# Patient Record
Sex: FEMALE | Race: WHITE | NOT HISPANIC OR LATINO | Employment: OTHER
[De-identification: names, ages, dates, MRNs, and addresses within clinical notes are randomized per-mention and may not be internally consistent; named-entity substitution may affect disease eponyms.]

---

## 2017-01-17 ENCOUNTER — RECORDS - HEALTHEAST (OUTPATIENT)
Dept: ADMINISTRATIVE | Facility: OTHER | Age: 62
End: 2017-01-17

## 2017-01-17 LAB
LAB AP CHARGES (HE HISTORICAL CONVERSION): NORMAL
PATH REPORT.COMMENTS IMP SPEC: NORMAL
PATH REPORT.COMMENTS IMP SPEC: NORMAL
PATH REPORT.FINAL DX SPEC: NORMAL
PATH REPORT.GROSS SPEC: NORMAL
PATH REPORT.RELEVANT HX SPEC: NORMAL
RESULT FLAG (HE HISTORICAL CONVERSION): NORMAL

## 2017-09-12 ENCOUNTER — RECORDS - HEALTHEAST (OUTPATIENT)
Dept: ADMINISTRATIVE | Facility: OTHER | Age: 62
End: 2017-09-12

## 2017-10-18 ENCOUNTER — RECORDS - HEALTHEAST (OUTPATIENT)
Dept: BONE DENSITY | Facility: CLINIC | Age: 62
End: 2017-10-18

## 2017-10-18 ENCOUNTER — RECORDS - HEALTHEAST (OUTPATIENT)
Dept: ADMINISTRATIVE | Facility: OTHER | Age: 62
End: 2017-10-18

## 2017-10-18 DIAGNOSIS — M85.80 OTHER SPECIFIED DISORDERS OF BONE DENSITY AND STRUCTURE, UNSPECIFIED SITE: ICD-10-CM

## 2017-11-17 ENCOUNTER — COMMUNICATION - HEALTHEAST (OUTPATIENT)
Dept: NEUROSURGERY | Facility: CLINIC | Age: 62
End: 2017-11-17

## 2017-11-29 ENCOUNTER — RECORDS - HEALTHEAST (OUTPATIENT)
Dept: ADMINISTRATIVE | Facility: OTHER | Age: 62
End: 2017-11-29

## 2017-12-06 ENCOUNTER — HOSPITAL ENCOUNTER (OUTPATIENT)
Dept: PHYSICAL MEDICINE AND REHAB | Facility: CLINIC | Age: 62
Discharge: HOME | End: 2017-12-06
Attending: PHYSICAL MEDICINE & REHABILITATION

## 2017-12-06 DIAGNOSIS — M79.18 MYOFASCIAL PAIN: ICD-10-CM

## 2017-12-06 DIAGNOSIS — M54.50 LUMBAR SPINE PAIN: ICD-10-CM

## 2017-12-06 DIAGNOSIS — M48.061 LUMBAR STENOSIS: ICD-10-CM

## 2017-12-06 DIAGNOSIS — M54.16 LUMBAR RADICULAR PAIN: ICD-10-CM

## 2017-12-06 DIAGNOSIS — M51.369 DEGENERATIVE LUMBAR DISC: ICD-10-CM

## 2017-12-06 ASSESSMENT — MIFFLIN-ST. JEOR: SCORE: 1425.64

## 2017-12-07 ENCOUNTER — COMMUNICATION - HEALTHEAST (OUTPATIENT)
Dept: PHYSICAL MEDICINE AND REHAB | Facility: CLINIC | Age: 62
End: 2017-12-07

## 2017-12-19 ENCOUNTER — RECORDS - HEALTHEAST (OUTPATIENT)
Dept: ADMINISTRATIVE | Facility: OTHER | Age: 62
End: 2017-12-19

## 2017-12-20 ENCOUNTER — OFFICE VISIT - HEALTHEAST (OUTPATIENT)
Dept: PHYSICAL THERAPY | Facility: REHABILITATION | Age: 62
End: 2017-12-20

## 2017-12-20 DIAGNOSIS — M54.50 LUMBAR SPINE PAIN: ICD-10-CM

## 2017-12-20 DIAGNOSIS — R19.8 ABDOMINAL WEAKNESS: ICD-10-CM

## 2017-12-20 DIAGNOSIS — M54.16 LUMBAR RADICULITIS: ICD-10-CM

## 2017-12-20 DIAGNOSIS — R29.3 POOR POSTURE: ICD-10-CM

## 2017-12-22 ENCOUNTER — OFFICE VISIT - HEALTHEAST (OUTPATIENT)
Dept: NEUROSURGERY | Facility: CLINIC | Age: 62
End: 2017-12-22

## 2017-12-22 DIAGNOSIS — M48.061 SPINAL STENOSIS OF LUMBAR REGION WITHOUT NEUROGENIC CLAUDICATION: ICD-10-CM

## 2017-12-22 DIAGNOSIS — M47.816 LUMBAR SPONDYLOSIS: ICD-10-CM

## 2017-12-22 DIAGNOSIS — M54.16 LUMBAR RADICULOPATHY: ICD-10-CM

## 2017-12-22 ASSESSMENT — MIFFLIN-ST. JEOR: SCORE: 1425.64

## 2017-12-26 ENCOUNTER — OFFICE VISIT - HEALTHEAST (OUTPATIENT)
Dept: PHYSICAL THERAPY | Facility: REHABILITATION | Age: 62
End: 2017-12-26

## 2017-12-26 DIAGNOSIS — M54.16 LUMBAR RADICULITIS: ICD-10-CM

## 2017-12-26 DIAGNOSIS — R19.8 ABDOMINAL WEAKNESS: ICD-10-CM

## 2017-12-26 DIAGNOSIS — R29.3 POOR POSTURE: ICD-10-CM

## 2017-12-26 DIAGNOSIS — M54.50 LUMBAR SPINE PAIN: ICD-10-CM

## 2018-01-02 ENCOUNTER — OFFICE VISIT - HEALTHEAST (OUTPATIENT)
Dept: PHYSICAL THERAPY | Facility: REHABILITATION | Age: 63
End: 2018-01-02

## 2018-01-02 DIAGNOSIS — R19.8 ABDOMINAL WEAKNESS: ICD-10-CM

## 2018-01-02 DIAGNOSIS — R29.3 POOR POSTURE: ICD-10-CM

## 2018-01-02 DIAGNOSIS — M54.16 LUMBAR RADICULITIS: ICD-10-CM

## 2018-01-02 DIAGNOSIS — M54.50 LUMBAR SPINE PAIN: ICD-10-CM

## 2018-01-04 ENCOUNTER — COMMUNICATION - HEALTHEAST (OUTPATIENT)
Dept: PHYSICAL THERAPY | Facility: REHABILITATION | Age: 63
End: 2018-01-04

## 2018-01-09 ENCOUNTER — OFFICE VISIT - HEALTHEAST (OUTPATIENT)
Dept: PHYSICAL MEDICINE AND REHAB | Facility: REHABILITATION | Age: 63
End: 2018-01-09

## 2018-01-09 DIAGNOSIS — M48.061 LUMBAR STENOSIS: ICD-10-CM

## 2018-01-09 DIAGNOSIS — M99.03 SOMATIC DYSFUNCTION OF LUMBAR REGION: ICD-10-CM

## 2018-01-09 DIAGNOSIS — M99.06 SOMATIC DYSFUNCTION OF LOWER EXTREMITY: ICD-10-CM

## 2018-01-09 DIAGNOSIS — M99.05 SOMATIC DYSFUNCTION OF PELVIS REGION: ICD-10-CM

## 2018-01-09 DIAGNOSIS — M54.50 LUMBAR SPINE PAIN: ICD-10-CM

## 2018-01-09 DIAGNOSIS — M99.04 SOMATIC DYSFUNCTION OF SACROILIAC JOINT: ICD-10-CM

## 2018-01-09 DIAGNOSIS — M54.16 LUMBAR RADICULAR PAIN: ICD-10-CM

## 2018-01-09 DIAGNOSIS — M99.02 SOMATIC DYSFUNCTION OF THORACIC REGION: ICD-10-CM

## 2018-01-09 DIAGNOSIS — M51.369 DEGENERATIVE LUMBAR DISC: ICD-10-CM

## 2018-01-09 ASSESSMENT — MIFFLIN-ST. JEOR: SCORE: 1448.32

## 2018-01-19 ENCOUNTER — HOSPITAL ENCOUNTER (OUTPATIENT)
Dept: PHYSICAL MEDICINE AND REHAB | Facility: CLINIC | Age: 63
Discharge: HOME | End: 2018-01-19
Attending: PHYSICAL MEDICINE & REHABILITATION

## 2018-01-19 DIAGNOSIS — M48.061 LUMBAR STENOSIS: ICD-10-CM

## 2018-01-19 DIAGNOSIS — M54.16 LUMBAR RADICULAR PAIN: ICD-10-CM

## 2018-01-19 ASSESSMENT — MIFFLIN-ST. JEOR: SCORE: 1424.51

## 2018-02-09 ENCOUNTER — HOSPITAL ENCOUNTER (OUTPATIENT)
Dept: PHYSICAL MEDICINE AND REHAB | Facility: CLINIC | Age: 63
Discharge: HOME | End: 2018-02-09
Attending: PHYSICAL MEDICINE & REHABILITATION

## 2018-02-09 DIAGNOSIS — M51.369 DEGENERATIVE LUMBAR DISC: ICD-10-CM

## 2018-02-09 DIAGNOSIS — M48.061 LUMBAR STENOSIS: ICD-10-CM

## 2018-02-09 DIAGNOSIS — M54.50 LUMBAR SPINE PAIN: ICD-10-CM

## 2018-02-09 DIAGNOSIS — M54.16 LUMBAR RADICULAR PAIN: ICD-10-CM

## 2018-02-09 ASSESSMENT — MIFFLIN-ST. JEOR: SCORE: 1410.9

## 2018-10-29 ENCOUNTER — RECORDS - HEALTHEAST (OUTPATIENT)
Dept: BONE DENSITY | Facility: CLINIC | Age: 63
End: 2018-10-29

## 2018-10-29 ENCOUNTER — RECORDS - HEALTHEAST (OUTPATIENT)
Dept: ADMINISTRATIVE | Facility: OTHER | Age: 63
End: 2018-10-29

## 2018-10-29 DIAGNOSIS — M85.80 OTHER SPECIFIED DISORDERS OF BONE DENSITY AND STRUCTURE, UNSPECIFIED SITE: ICD-10-CM

## 2019-06-03 ENCOUNTER — TELEPHONE (OUTPATIENT)
Dept: OTHER | Facility: CLINIC | Age: 64
End: 2019-06-03

## 2019-06-03 ENCOUNTER — TRANSFERRED RECORDS (OUTPATIENT)
Dept: HEALTH INFORMATION MANAGEMENT | Facility: CLINIC | Age: 64
End: 2019-06-03

## 2019-06-03 NOTE — TELEPHONE ENCOUNTER
Referral received via fax 6/3/19.    Pt referred to [facility] by Dr. Ortiz for left TOS.    Left UE venous and arterial US report accompanied fax, along with consult notes.    Pt needs to be scheduled for consult with Dr. Duarte (per referring provider).  Will route to scheduling to coordinate an appointment at next available.    Andra Lala, KAILEEN RN

## 2019-06-03 NOTE — TELEPHONE ENCOUNTER
Dr Ortiz called to refer Jazmine to Dr Duarte for Thoracic Outlet Syndrome. He will be faxing records over to us at [phone].  Patient can be reached at [phone].

## 2019-06-03 NOTE — TELEPHONE ENCOUNTER
Attempted to reach patient a home/mobile number but wasn't able to leave a message. I will attempt to reach patient later.

## 2019-06-11 NOTE — TELEPHONE ENCOUNTER
Second fax received requesting referral for TOS (to Dr. Duarte).     I called Dr. Ortiz's office to notify them pt unable to be reached, obtained alternative phone number for pt: [phone].     Routing to  to please try pt again at new number for new consult with Dr. Duarte for TOS.    Thelma Frazier, KAILEEN, RN

## 2019-06-12 NOTE — TELEPHONE ENCOUNTER
I spoke with the patient and she states that she is about to travel out of town until July and she will call when she returns and knows her schedule.

## 2019-08-22 ENCOUNTER — OFFICE VISIT (OUTPATIENT)
Dept: OTHER | Facility: CLINIC | Age: 64
End: 2019-08-22
Attending: SURGERY
Payer: COMMERCIAL

## 2019-08-22 VITALS
HEART RATE: 62 BPM | SYSTOLIC BLOOD PRESSURE: 148 MMHG | OXYGEN SATURATION: 98 % | WEIGHT: 190 LBS | BODY MASS INDEX: 31.65 KG/M2 | HEIGHT: 65 IN | DIASTOLIC BLOOD PRESSURE: 77 MMHG | RESPIRATION RATE: 16 BRPM

## 2019-08-22 DIAGNOSIS — G54.0 TOS (THORACIC OUTLET SYNDROME): ICD-10-CM

## 2019-08-22 DIAGNOSIS — M79.622 PAIN OF LEFT UPPER ARM: Primary | ICD-10-CM

## 2019-08-22 PROCEDURE — 99203 OFFICE O/P NEW LOW 30 MIN: CPT | Mod: ZP | Performed by: SURGERY

## 2019-08-22 PROCEDURE — G0463 HOSPITAL OUTPT CLINIC VISIT: HCPCS

## 2019-08-22 RX ORDER — SPIRONOLACTONE 25 MG
TABLET ORAL
COMMUNITY

## 2019-08-22 RX ORDER — CHLORAL HYDRATE 500 MG
2 CAPSULE ORAL DAILY
COMMUNITY

## 2019-08-22 RX ORDER — CHOLECALCIFEROL (VITAMIN D3) 50 MCG
1 TABLET ORAL DAILY
COMMUNITY

## 2019-08-22 SDOH — HEALTH STABILITY: MENTAL HEALTH: HOW OFTEN DO YOU HAVE A DRINK CONTAINING ALCOHOL?: 2-3 TIMES A WEEK

## 2019-08-22 ASSESSMENT — MIFFLIN-ST. JEOR: SCORE: 1427.71
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
## 2019-08-22 NOTE — PROGRESS NOTES
"Jazmine Potter is a 63 year old female who presents for:  Chief Complaint   Patient presents with     RECHECK     New referral for L TOS to Dr Duarte from Dr Ortiz, records faxed and in EPIC. *jam        Vitals:    Vitals:    08/22/19 1401   BP: (!) 148/77   BP Location: Left arm   Patient Position: Chair   Cuff Size: Adult Regular   Pulse: 62   Resp: 16   SpO2: 98%   Weight: 190 lb (86.2 kg)   Height: 5' 5\" (1.651 m)       BMI:  Estimated body mass index is 31.62 kg/m  as calculated from the following:    Height as of this encounter: 5' 5\" (1.651 m).    Weight as of this encounter: 190 lb (86.2 kg).    Pain Score:  Data Unavailable        Gaby Polk [facility]    "

## 2019-10-31 ENCOUNTER — RECORDS - HEALTHEAST (OUTPATIENT)
Dept: ADMINISTRATIVE | Facility: OTHER | Age: 64
End: 2019-10-31

## 2019-11-07 ENCOUNTER — RECORDS - HEALTHEAST (OUTPATIENT)
Dept: BONE DENSITY | Facility: CLINIC | Age: 64
End: 2019-11-07

## 2019-11-07 ENCOUNTER — RECORDS - HEALTHEAST (OUTPATIENT)
Dept: ADMINISTRATIVE | Facility: OTHER | Age: 64
End: 2019-11-07

## 2019-11-07 DIAGNOSIS — Z78.0 ASYMPTOMATIC MENOPAUSAL STATE: ICD-10-CM

## 2021-05-31 VITALS — BODY MASS INDEX: 31.34 KG/M2 | WEIGHT: 195 LBS | HEIGHT: 66 IN

## 2021-05-31 VITALS — HEIGHT: 66 IN | BODY MASS INDEX: 30.53 KG/M2 | WEIGHT: 190 LBS

## 2021-05-31 VITALS — HEIGHT: 66 IN | WEIGHT: 188 LBS | BODY MASS INDEX: 30.22 KG/M2

## 2021-06-01 VITALS — BODY MASS INDEX: 29.73 KG/M2 | HEIGHT: 66 IN | WEIGHT: 185 LBS
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
## 2021-06-14 NOTE — PROGRESS NOTES
NEUROSURGERY CONSULTATION NOTE    12/22/2017       CHIEF COMPLAINT: Low back pain, bilateral lower extremity pain    HPI:    Jazmine Potter is a 62 y.o. female who is sent to us in consultation by Anam Gaston for evaluation of lumbar spinal stenosis.    Jazmine notes that she has been experiencing low back pain chronically for years, but in October of this year she flared up her back pain after a 23 mile bike ride.  She notes that following this she began experiencing pain into the bilateral buttocks, worse on the right than the left.  She notes that sometimes her right-sided buttock pain will radiate down the leg with associated numbness in the lateral aspect of the calf which spreads down into the sole of the foot and out through the toes.  She knows she will also get intermittent left-sided numbness and tingling primarily in the sole of the foot.  She notes that her back pain and the pain in her right buttock are constant.  She notes her left-sided pain primarily seems to be aggravated by sleeping in an incorrect position, she notices it most upon awakening certain mornings.  She notes that when she gets pain that radiates into the legs it primarily moves from the buttock into the top half of the posterior thighs.  She notes that for her pain she has been taking naproxen, and feels that this is largely improved her left-sided symptoms.  She is also taking turmeric, has completed physical therapy in 2008 and has now resumed it with her first session earlier today.  She notes that she had undergone previous epidural steroid injections in 2008 with no significant benefit.  She notes her pain is aggravated with prolonged sitting and is relieved with getting up and moving around.    Past Medical History:   Diagnosis Date     Hypercholesterolemia     Created by Conversion      Rosacea     Created by Conversion      Past Surgical History:   Procedure Laterality Date     BLADDER SURGERY  1992     CHOLECYSTECTOMY   "1992     HAND SURGERY Left     2017 left thumb         REVIEW OF SYSTEMS:  She denies any changes in her bowel or bladder habits.  She notes that her symptoms of numbness and tingling are not daily, but they occur multiple times a week.  She denies any difficulty walking distances.  She denies any overt weakness.  A full 14 point review of systems was otherwise completed and is negative aside from that mentioned above in the HPI    MEDICATIONS:    Current Outpatient Prescriptions   Medication Sig Dispense Refill     aspirin 81 MG EC tablet Take by mouth.       cholecalciferol, vitamin D3, 1,000 unit capsule Take by mouth.       coenzyme Q10 100 mg/mL Liqd Take 15 mg by mouth.       magnesium 250 mg Tab Take 250 mg by mouth.       tretinoin (RETIN-A) 0.1 % cream MUSTAPHA EXT AA HS  2     triamcinolone (KENALOG) 0.5 % ointment Apply topically.       No current facility-administered medications for this visit.          ALLERGIES/SENSITIVITIES:     No Known Allergies    PERTINENT SOCIAL HISTORY:   Social History     Social History     Marital status:      Spouse name: N/A     Number of children: N/A     Years of education: N/A     Social History Main Topics     Smoking status: Never Smoker     Smokeless tobacco: Never Used     Alcohol use Yes     Drug use: No     Sexual activity: Not Asked     Other Topics Concern     None     Social History Narrative         FAMILY HISTORY:  Family History   Problem Relation Age of Onset     Cancer Mother      Cancer Father      Heart disease Father      Varicose Veins Father      Heart attack Father      Diabetes Maternal Grandmother      Stroke Maternal Grandfather      Aneurysm Paternal Grandfather         PHYSICAL EXAM:     Constitution: /75  Pulse 64  Ht 5' 5.5\" (1.664 m)  Wt 190 lb (86.2 kg)  SpO2 97%  BMI 31.14 kg/m2.   Awake, alert and in NAD  Eyes: Conjugate gaze. Conjunctiva benign without icterus or injection  Heart: RRR  Lungs: Non-labored respiration without " accessory muscle use  Skin: No obvious rash or lesion  Psych: Appropriate mood and affect, alert and oriented x 3  Mental Status:  Speech is fluent.  Recent and remote memory are intact.  Attention span and concentration are normal.     Cranial Nerves:  CN2: No funduscopic exam performed. CN3,4 & 6: Pupillary light response, lateral and vertical gaze normal.  No nystagmus.  CN7: No facial weakness, smile, facial symmetry intact. CN8: Intact to spoken voice.      Motor:  Normal bulk and tone all muscle groups of upper and lower extremities. Strength is 5/5 in all muscle groups of the bilateral upper and lower extremities     Sensory: Sensation intact bilaterally to light touch and temperature throughout. Sensation is intact to pinprick in the bilateral LE.  Sensation is intact to vibratory sense in the lower extremities bilaterally.     Coordination: Gait is normal.  Patient is able to heel and toe walk without difficulty.  Tandem gait is normal.  Rapid alternating movements in the upper extremities are within normal limits.     Reflexes; unable to elicit deep tendon reflexes in the right upper extremity.  2+ biceps, triceps, brachioradialis in the left upper extremity.  2+ bilateral patellar and Achilles reflexes.  Toes are downgoing response to plantar stimulation.  No clonus.  No Pat.     Musculoskeletal: Negative straight leg raise bilaterally.    IMAGING: I personally reviewed all radiographic images    MRI of the lumbar spine 11/14/2017: There is diffuse multilevel lumbar spondylosis.  At L1-2 there is a mild disc bulge associated with mild disc height loss.  No significant central canal or neural foraminal stenosis.  At L2-3 there are similar findings.  At L3-4, there is moderate disc height loss with a broad-based paracentral disc bulge resulting in mild to moderate lateral recess stenosis bilaterally and mild central canal stenosis.  At L4-5, there is moderate to severe disc height loss with a  broad-based paracentral disc bulge that may contain a centrally herniated disc component.  This in conjunction with bilateral facet hypertrophy results in severe central canal stenosis and severe bilateral recess stenosis.  Distal to this, her cauda equina demonstrates some tortuosity.  L5-S1 demonstrates very minimal paracentral disc bulge with no significant central canal stenosis or foraminal stenosis.      CONSULTATION ASSESSMENT AND PLAN:    Jazmine Potter is a 62-year-old female with L4-5 lumbar spinal stenosis secondary to lumbar spondylosis and bilateral facet hypertrophy with associated lumbar radiculopathy    I reviewed Jazmine's MRI with her today in clinic, noting her severe central canal stenosis at L4-5.  And noted the patient's with stenosis in this area can experience pain in the back that radiates into the bilateral buttocks and on the posterior aspects of the thighs.  I noted the most classically the symptoms are aggravated with movement.  In her case, I noted there may be some component of back and buttock pain that is related to lumbar facet arthropathy as she does have facet hypertrophy on her MRI.  I noted that with surgical intervention, unless she were to undergo lumbar fusion, this would not be treated.  However, I noted that for her spinal stenosis, I see no evidence of lumbar spondylolisthesis and at present I am inclined to believe she would benefit most from a lumbar laminectomy for central and lateral recess decompression.  I noted that her symptoms that extend into the lateral aspect of the calf into the foot, are consistent with a lumbar radiculopathy would likely be improved with decompression.  She notes that at present she would like to continue with conservative therapies, and consider possible repeat epidural steroid injection.  She would otherwise like to consider her options and will be in contact with our clinic if her symptoms persist and are refractory to these conservative  cares.  If she gets to the point where she is considering surgical intervention, I would recommend lumbar spine flexion extension x-rays to ensure no evidence of dynamic instability.    I spent more than 60 minutes in this apt, examining the pt, reviewing the scans, reviewing notes from chart, discussing treatment options with risks and benefits and coordinating care. >50 % clinic time was spent in face to face counseling and coordinating care    Fransisca Lovell MD      Cc:   Imelda Dorsey MD

## 2021-06-14 NOTE — PROGRESS NOTES
Assessment/Plan:      Diagnoses and all orders for this visit:    Lumbar spine pain  -     Ambulatory referral to Physical Therapy    Lumbar stenosis  -     Ambulatory referral to Physical Therapy  -     Ambulatory referral to Neurosurgery    Lumbar radicular pain  -     Ambulatory referral to Physical Therapy  -     Ambulatory referral to Neurosurgery    Myofascial pain    Degenerative lumbar disc  -     Ambulatory referral to Physical Therapy        Assessment: Very pleasant 62-year-old female otherwise healthy with:    1.  9-10 years of lumbar spine pain worsening with time has become quite severe recently.  She has broad-based disc bulge at L4-5 with an extruded disc herniation and degenerative changes resulting in severe central stenosis and compression of the nerve roots with a serpiginous path.    2.  Right greater than left lower extremity radicular symptoms likely related to the central stenosis at L4-5.  3.  Degenerative disc disease and facet arthropathy.  Facet arthropathy most significant L4-5 and L3-4.  4.  Some myofascial pain lumbar spine gluteal region.      Discussion:    1.  She has been working through lumbar spine pain for the past 9-10 years.  We discussed the diagnoses and treatment options.  She has modified her activity as over time and is no longer able to walk to 13 miles per day that she has in the past.  She is now much more sedentary.  We discussed the severe central stenosis at L4-5 as well as options conservatively including therapy, medications, interventions and surgical option.  2.  Given the severe central stenosis at L4-5 with serpiginous nerve roots and  compression of the cauda equina, recommend surgical evaluation.  She would like to see Dr. Villa and referral is placed.  3.  We will start her in physical therapy for nerve glides and pelvic tilt program.  Orthotics may also be helpful and they can do her orthotic evaluation.  4.  We discussed medications.  She is not interested  in meloxicam or other anti-inflammatories.  We will trial turmeric over-the-counter.  5.  Consideration for lumbar epidural.  This could be done at L5-S1 bilaterally to treat compression of the L5 nerve roots by the L4-5 disc.  Other options for lumbar spine pain would include facet injections however given her significant leg symptoms and compression of the nerve roots at 4-5, epidural may be initially trialed.  She is to trial physical therapy initially.  6.  Return to clinic 3-4 weeks      It was our pleasure caring for your patient today, if there any questions or concerns please do not hesitate to contact us.      Subjective:   Patient ID: Jazmine Potter is a 62 y.o. female.    History of Present Illness: Patient presents for evaluation of lumbar spine pain. She is [employer] and is here for a medical spine evaluation. This is been present for approximately 10 years.  No specific injury.  She has done a lot of lifting at work and lifts a lot of heavy pipes as she is a .  She also stands on concrete.  She had some pain 10 years ago which is been worsening with time.  She previously had lumbar spine pain and some gluteal pain.  Was seen by a spine physician in 2009 at 3 lumbar epidurals at [facility] to I believe for L4-5 interlaminar is in a L5-S1 transforaminal epidural she tells me none of them helped.  Was doing physical therapy including home exercises and [employer] and had a Cristi chair.  No real improvement in her symptoms in fact her symptoms have worsened.    Her pain is now across lumbosacral junction with pain into the bilateral gluteal region to the right is typically worse in the left and she has pain with numbness and tingling down the back of the right leg to the bottom of the right foot.  She generally has pain in the right or the left side and it depends on the day but the right is typically worse than the left.  Walking is difficult for her she has significant pain the next day.  Sitting in  one place is difficult and causes increasing pain.  Better with changing position.  She previously was walking 13 miles a day and is now stopped really with activity secondary to her pain.  When her pain is severe she does get knee pain and leg weakness.    She sees a chiropractor has not seen a chiropractor in 2 months.  Physical therapy was in 2009 nothing recent.  Still does her home exercises a little bit.  Has tried ibuprofen and Aleve with no benefit.  Excedrin helps some.  Has a recent MRI.        Imaging: MRI report and images were personally reviewed and discussed with the patient.  A plastic model was utilized during the discussion.  MRI of the lumbar spine personally reviewed.  This is performed at [facility].  Severe central stenosis L4-5 with new broad-based disc bulge and central disc herniation resulting in cauda equina impingement.  Facet arthropathy L3-4 and L4-5.  Some lateral recess stenosis L3-4.    Review of Systems: Some dizziness back pain fainting.  No bowel or bladder incontinence.  No urinary retention.  No rashes. Remainder of 12 point review systems negative unless listed above.    History reviewed. No pertinent past medical history.    The following portions of the patient's history were reviewed and updated as appropriate: allergies, current medications, past family history, past medical history, past social history, past surgical history and problem list.      WHO 5: 19    BOBBY Score: 28      Objective:   Physical Exam:    Vitals:    12/06/17 1502   BP: 131/68   Pulse: 66       General:  Well-appearing female in no acute distress.  Pleasant, cooperative, and interactive throughout the examination and interview.  CV: No lower extremity edema on inspection or paltation.  Lymphatics: No cervical lymphadenopathy palpated. Eyes: sclera clear. Skin: No rashes or lesions seen over the head/neck, hairline, arms, legs,  .  Respirations unlabored.  MSK: Gait is normal.  Able to heel-toe walk without  difficulty.  Negative Romberg.  Spine: normal AP curves of the C, T, and L spine.  Full range of motion in the Lumbar spine in flexion and extension.  Palpation: Minimal tenderness over the paraspinals at the lumbosacral junction.  Extremities: Full range of motion of the elbows, and wrists with no effusions or tenderness to palpation.   Full range of motion of the hips, knees, and ankles with no effusions or tenderness to palpation.  Negative scour maneuver and minimal positive Kumar's test in the right for low back pain.  Negative left.. No hypermobility of the upper or lower extremities.  Neurologic exam: Mental status: Patient is alert and oriented with normal affect.  Attention, knowledge, memory, and language are intact.  Normal coordination throughout the examination.  Reflexes are 2+ and symmetric biceps, triceps, brachioradialis, patellar, and Achilles with down-going toes and Negative Pat's.  Sensation is intact to light touch throughout the upper and lower extremities bilaterally.  Manual muscle testing reveals 5 out of 5 in the hip flexors, knee flexors/extensors, ankle plantar flexors, ankle  dorsiflexors, and EHL.  Upper extremities: Grossly normal strength . Normal muscle bulk and tone in the arms and legs.    Mild positive supine straight leg raise on the right negative left.

## 2021-06-14 NOTE — PROGRESS NOTES
Neurosurgery consultation was requested by: Dr. Anam Gaston  Pain: Low back pain  Radicular Pain is present: Back pain radiating down bilateral buttocks to both feet, right side is worst  Lhermitte sign: Denies  Motor complaints: Occasional weakness on bilateral legs  Sensory complaints: Numbness, tingling right leg, burning sensation lower back, occasional tingling on bottom of left foot  Gait and balance issues: Shopping cart syndrome  Bowel or bladder issues: No  Duration of SX is: Since 2007  The symptoms are worse with: Walking long distances, can't sit for prolonged periods  The symptoms are better with: Changing positions, lying down  Injury: No  Severity is: Chronic  Patient has tried the following conservative measures: Started PT only had one session so far will continue sessions  BOBBY score is: 11%  DAVID Dooley

## 2021-06-15 NOTE — PROGRESS NOTES
Assessment/Plan:      Diagnoses and all orders for this visit:    Lumbar spine pain  -     meloxicam (MOBIC) 7.5 MG tablet; Take 1-2 tablets (7.5-15 mg total) by mouth daily.  Dispense: 30 tablet; Refill: 0    Lumbar stenosis  -     OPS TFESI Lumbar Bilateral; Future; Expected date: 1/9/18  -     meloxicam (MOBIC) 7.5 MG tablet; Take 1-2 tablets (7.5-15 mg total) by mouth daily.  Dispense: 30 tablet; Refill: 0    Lumbar radicular pain  -     OPS TFESI Lumbar Bilateral; Future; Expected date: 1/9/18  -     meloxicam (MOBIC) 7.5 MG tablet; Take 1-2 tablets (7.5-15 mg total) by mouth daily.  Dispense: 30 tablet; Refill: 0    Degenerative lumbar disc  -     meloxicam (MOBIC) 7.5 MG tablet; Take 1-2 tablets (7.5-15 mg total) by mouth daily.  Dispense: 30 tablet; Refill: 0    Somatic dysfunction of lumbar region    Somatic dysfunction of pelvis region    Somatic dysfunction of lower extremity    Somatic dysfunction of thoracic region    Somatic dysfunction of sacroiliac joint        Assessment:Very pleasant 62-year-old female otherwise healthy with:     1.  9-10 years of lumbar spine pain worsening with time has become quite severe recently.  She has broad-based disc bulge at L4-5 with an extruded disc herniation and degenerative changes resulting in severe central stenosis and compression of the nerve roots within the lateral recess.  Her symptoms have somewhat stabilized with physical therapy however she has had some right leg paresthesias in an L5 distribution.     2.  Right greater than left lower extremity radicular symptoms likely related to the central stenosis at L4-5.  3.  Degenerative disc disease and facet arthropathy.  Facet arthropathy most significant L4-5 and L3-4.  4.    Somatic dysfunctions of the thoracic spine, lumbar spine, sacrum, pelvis, lower extremities that contribute to the patient's pain complaints.      Discussion:    1.  I discussed the diagnosis and treatment options.  We spent a great deal  of time discussing her visit with physical therapy along with the neurosurgeon.  We discussed medications, interventions, manual medicine and other alternatives.  2.  Trial meloxicam as needed for pain in place of naproxen.  3.  She is not interested in neuropathic pain medications.  4.  Trial bilateral L5-S1 transforaminal epidural steroid injection to target the L5 nerves in the lateral recess given her right L5 radicular presentation.  5.  She does inquire about manual treatment.  OMT is a good option to see if this can help decrease some of her symptoms.  OMT today.  She agrees to proceed.  Please see attached procedure note.  6.  I did provide some hip flexor stretches for her.  7.  Continue with 1 or 2 more visits of physical therapy to fine tune her home program.  8.  Return to clinic 2-4 weeks after injection.      It was our pleasure caring for your patient today, if there any questions or concerns please do not hesitate to contact us.    25 minutes were spent with this patient in addition to any procedure with greater than 20 minutes in counseling and coordination of care.      Subjective:   Patient ID: Jazmine Potter is a 62 y.o. female.    History of Present Illness:Patient presents for evaluation of low back pain.  Symptoms have minimally changed since last visit.  She has some mild improvement in back pain but has had increasing right leg paresthesias.  Her back pain is across lumbosacral junction.  Rated a 4/10 today 8/10 at worst.  Worse at the end of the day and better first thing in the morning.  She started to have some right leg paresthesias down the back of the leg to the top of the right foot into the great toe region or dorsal foot.  This is now resolved.    She is doing her physical therapy and doing her home exercises.  She questions if there are other stretches she can do.  She has been to approximately 4 visits of therapy unsure if it is significantly helpful.  She also has seen   Irina and I reviewed the notes.  She did review options of lumbar laminectomy with the patient.  Recommended flexion-extension x-rays prior to any surgical intervention.    We also reviewed previous injections.  These were done at [facility] included an interlaminar L3-4 injection and L4-5 injection with a right L5-S1 transforaminal.  These were done over 8 years ago.  She does not recall they offer long lasting benefit but again these were 8 years ago.  She does take some naproxen which does help.  Taking naproxen twice daily is helpful but she wants to avoid taking that much medication.  Wants to avoid medications with sedating features as well.      Imaging: MRI lumbar spine from [facility] personally reviewed.  Images and report personally reviewed.  Plastic model was used in the discussion.  Degenerative disc disease multiple levels with disc bulging.  At L4-5 large central disc bulge/herniation resulting in severe central stenosis with serpiginous nerve roots.  Significant compression of the L5 nerves in the lateral recess.    Review of Systems: Some numbness and tingling right leg.  She has a headache.  Occasional weakness in the legs.  No bowel or bladder incontinence, dizziness, nausea, vomiting, blurred vision or balance changes.    Past Medical History:   Diagnosis Date     Hypercholesterolemia     Created by Conversion      Rosacea     Created by Conversion        The following portions of the patient's history were reviewed and updated as appropriate: allergies, current medications, past family history, past medical history, past social history, past surgical history and problem list.      Objective:   Physical Exam:    Vitals:    01/09/18 0755   BP: 145/62   Pulse: 66       General: Alert and oriented with normal affect. Attention, knowledge, memory, and language are intact. No acute distress.   Eyes: Sclerae are clear.  Respirations: Unlabored. CV: No lower extremity edema.   Gait:  Nonantalgic  Sensation is  intact to light touch throughout the   lower extremities.  Reflexes are  2+ patellar and Achilles with downgoing toes.    Manual muscle testing reveals:  Right /Left out of 5     5/5 hip flexors  5/5 knee flexors  5/5 knee extensors  5/5 ankle plantar flexors  5/5 ankle dorsiflexors  5/5  EHL        Structural exam:  Thoracic spine:   T12 rotated left sidebent left. Lumbar spine: L4 rotated left side bent left flexed, L5 rotated right sidebent left. Pelvis: Right innominate upslip, anterior inferior right innominate. Sacrum: Left unilateral sacral shear. Lower extremity: Hypertonic hip flexors bilaterally external tibial torsion left.. Gluteal hypertonic tissue textures/restrictions bilaterally.    Procedure:    After discussing the risks and benefits of osteopathic manipulative medicine, verbal consent was obtained. The somatic dysfunctions listed above were treated with the following techniques:     Thoracic spine: Myofascial release .  Lumbar spine: Myofascial release technique, muscle energy, . Pelvis: Still technique and Isometrics. Sacrum: Myofascial release.  Lower extremities: Muscle energymyofascial release/F IN/BLT.     The patient tolerated the procedure well and had improved range of motion in all areas treated prior to leaving the clinic.

## 2021-06-15 NOTE — PROGRESS NOTES
[facility] Daily Progress     Patient Name: Jazmine Potter  Date: 12/26/2017  Visit #: 2/12  Referral Diagnosis: Lumbar spine pain  Referring provider: Anam Gaston DO  Visit Diagnosis:     ICD-10-CM    1. Lumbar spine pain M54.5    2. Lumbar radiculitis M54.16    3. Poor posture R29.3    4. Abdominal weakness R19.8        Assessment:     Patient is a 62 y.o. female that presents with signs and symptoms consistent with midline low back pain with radicular symptoms R>L secondary to L4-5 disc bulge with severe canal stensosis. Patient demonstrates impairments including decreased joint mobility, flexibility and tissue extensibility of lumbar spine with poor postural awareness and core stability leading to impaired functional mobility. Patient's functional limitations include sitting for longer than 10 minutes, walking for longer than 30 minutes, and standing still.    Today patient reports minimal change since initial visit, however no increase of pain. Patient was educated on TrA activation and importance to her recovery process; patient demonstrated understanding.     HEP/POC compliance is  good .  Patient demonstrates understanding/independence with home program.  Patient is appropriate to continue with skilled physical therapy intervention, as indicated by initial plan of care.    Goal Status:  Pt. will be independent with home exercise program in : 4 weeks  Pt. will improve posture : and demonstrate posture with minimal to no cuing;in sitting;in standing;in walking;in 6 weeks  Pt. will be able to walk : 30 minutes;on even surfaces;with no pain;with less difficulty;for household mobility;for community mobility;for exercise/recreation;in 6 weeks  Patient will stand : 30 minutes;with less pain;with less difficultty;for home chores;in 4 weeks  Pt. will bend: to dress;to clean;with no pain;with less difficulty;for self care;in 6 weeks  Patient will sit: 10 minutes;for driving;for work;with less  pain;with less difficultty;in 6 weeks    Plan / Patient Education:     Continue with initial plan of care.  Progress with home program as tolerated.    Plan for next visit: yoga ball exercises for lumbar spine, LTR, abdo isos, planks, prone push ups    Subjective:      Pain Ratin  Patient reports she feels about the same, she has ran out of her medication and this is day 2 so she is struggling a little. Just on the R side, running down the back of her thigh; it has been kind of the same all day long. Sleeping has been better, not as much pain as it had been. Patient met with surgeon, who stated she isn't concerned about emergency surgery, she is willing to try physical therapy - possibly a candidate for decompression or laminectomy.     Pain description: all of it at certain times, sometimes it goes down her leg and goes out her toes on the R, she sometimes has burning across the middle of the back. She sometimes gets sharp pains down the L side    Functional limitations are described as occurring with:   bending  lifting  performing routine daily activities  sitting for longer than 10 minutes, standing for any length of time still  twisting or turning trunk  walking for about 30 minutes    Objective:       Treatment Today       Patient Education: Patient was educated on continuing plan of care, progress and review of current HEP. Patient educated on importance of consistency with exercise and therapy, as well as activity modification in order to see change and improvements. Patient demonstrated and verbalized understanding.     Manual Therapy:  MFR of bilateral lumbar paraspinals and QL in prone - slight increase of tenderness with decreased tissue extensibility     Exercises:  Exercise #1: SKTC - hold 30 sec x 2  Comment #1: supine piriformis figure 4 - hold 30 sec x 2  Exercise #2: supine QL stretch - hold 30 sec x 2  Comment #2: pelvic tilts in supine, sitting, standing - 10 reps   Exercise #3: slump nerve  sliders/tensioners - 10-20 reps  Comment #3: supine bridge with hip adduction - 10 reps x 2      TREATMENT MINUTES COMMENTS   Evaluation     Self-care/ Home management     Manual therapy 15 MFR of bilateral lumbar paraspinals and QL in prone - slight increase of tenderness with decreased tissue extensibility    Neuromuscular Re-education 10 TrA education and activation - progress to marching   Therapeutic Activity     Therapeutic Exercises 6 See above flowsheet; Nustep 6 minutes    Gait training     Modality__________________                Total 31    Blank areas are intentional and mean the treatment did not include these items.       Isatu Rey, PT  12/26/2017

## 2021-06-15 NOTE — PROGRESS NOTES
[facility] Discharge Summary  Patient Name: Jazmine Potter  Date: 2/6/2018  Referral Diagnosis: Lumbar spine pain  Referring provider: Anam Gaston DO  Visit Diagnosis:   1. Lumbar spine pain     2. Lumbar radiculitis     3. Poor posture     4. Abdominal weakness         Goals:  Pt. will be independent with home exercise program in : 4 weeks  Pt. will improve posture : and demonstrate posture with minimal to no cuing;in sitting;in standing;in walking;in 6 weeks  Pt. will be able to walk : 30 minutes;on even surfaces;with no pain;with less difficulty;for household mobility;for community mobility;for exercise/recreation;in 6 weeks  Patient will stand : 30 minutes;with less pain;with less difficultty;for home chores;in 4 weeks  Pt. will bend: to dress;to clean;with no pain;with less difficulty;for self care;in 6 weeks  Patient will sit: 10 minutes;for driving;for work;with less pain;with less difficultty;in 6 weeks  No Data Recorded    Patient was seen for 3 visits for physical therapy of lumbar spine pain from 12/20/17 to 1/2/17 with one no show appointments.   The patient attended therapy initially, but did not finish the therapy sessions prescribed.  Goals were not fully achieved. Explanation for goals not achieved: Patient did not return to measure goals  The patient has not shown for their scheduled appointment(s) and has not called ot reschedule.  Goals were not met.  The patient discontinued therapy, did not return.    Therapy will be discontinued at this time.  The patient will need a new referral to resume physical therapy treatment. Please see below for patient's current status.    Thank you for your referral.  Isatu Rey, PT, DPT  2/6/2018   8:21 AM      [facility] Daily Progress     Patient Name: Jazmine Potter  Date: 1/2/2018  Visit #: 3/12  Referral Diagnosis: Lumbar spine pain  Referring provider: Anam Gaston DO  Visit Diagnosis:     ICD-10-CM    1. Lumbar  spine pain M54.5    2. Lumbar radiculitis M54.16    3. Poor posture R29.3    4. Abdominal weakness R19.8        Assessment:     Patient is a 62 y.o. female that presents with signs and symptoms consistent with midline low back pain with radicular symptoms R>L secondary to L4-5 disc bulge with severe canal stensosis. Patient demonstrates impairments including decreased joint mobility, flexibility and tissue extensibility of lumbar spine with poor postural awareness and core stability leading to impaired functional mobility. Patient's functional limitations include sitting for longer than 10 minutes, walking for longer than 30 minutes, and standing still.    Today patient reports minimal change since initial visit, however no increase of pain. Patient was educated on TrA activation and importance to her recovery process; patient demonstrated understanding.     HEP/POC compliance is  good .  Patient demonstrates understanding/independence with home program.  Patient is appropriate to continue with skilled physical therapy intervention, as indicated by initial plan of care.    Goal Status:  Pt. will be independent with home exercise program in : 4 weeks  Pt. will improve posture : and demonstrate posture with minimal to no cuing;in sitting;in standing;in walking;in 6 weeks  Pt. will be able to walk : 30 minutes;on even surfaces;with no pain;with less difficulty;for household mobility;for community mobility;for exercise/recreation;in 6 weeks  Patient will stand : 30 minutes;with less pain;with less difficultty;for home chores;in 4 weeks  Pt. will bend: to dress;to clean;with no pain;with less difficulty;for self care;in 6 weeks  Patient will sit: 10 minutes;for driving;for work;with less pain;with less difficultty;in 6 weeks    Plan / Patient Education:     Continue with initial plan of care.  Progress with home program as tolerated.    Plan for next visit: abdo isos, prone push ups    Subjective:      Pain Ratin-5 a  little irritating   Patient was able to walk for about 45 minutes today, and she did a few squats and lunges and that felt okay. Just on the R side, running down the back of her thigh; it has been kind of the same all day long. No difficulty with the exercises and she is concentrating on posture and core activation. Patient met with surgeon, who stated she isn't concerned about emergency surgery, she is willing to try physical therapy - possibly a candidate for decompression or laminectomy.     Pain description: all of it at certain times, sometimes it goes down her leg and goes out her toes on the R, she sometimes has burning across the middle of the back. She sometimes gets sharp pains down the L side    Functional limitations are described as occurring with:   bending  lifting  performing routine daily activities  sitting for longer than 10 minutes, standing for any length of time still  twisting or turning trunk  walking for about 30 minutes    Objective:       Treatment Today       Patient Education: Patient was educated on continuing plan of care, progress and review of current HEP. Patient educated on importance of consistency with exercise and therapy, as well as activity modification in order to see change and improvements. Patient demonstrated and verbalized understanding.     Manual Therapy:  MFR of bilateral lumbar paraspinals and QL in prone - slight increase of tenderness with decreased tissue extensibility     Exercises:  Exercise #1: SKTC - hold 30 sec x 2  Comment #1: supine piriformis figure 4 - hold 30 sec x 2  Exercise #2: supine QL stretch - hold 30 sec x 2  Comment #2: pelvic tilts in supine, sitting, standing - 10 reps   Exercise #3: slump nerve sliders/tensioners - 10-20 reps  Comment #3: supine bridge with hip adduction - 10 reps x 2      TREATMENT MINUTES COMMENTS   Evaluation     Self-care/ Home management     Manual therapy 10 MFR of bilateral lumbar paraspinals and QL in prone - slight  increase of tenderness with decreased tissue extensibility    Neuromuscular Re-education 15 TrA education and activation - yoga ball exercises with pelvic tilts, pelvic circles, marching hip add and abd   Planks and side planks   Therapeutic Activity     Therapeutic Exercises 6 See above flowsheet; Nustep 4 minutes resistance 3  LTR   Gait training     Modality__________________                Total 31    Blank areas are intentional and mean the treatment did not include these items.       Isatu Rey, PT  1/2/2018

## 2021-06-15 NOTE — PROGRESS NOTES
Assessment/Plan:      Diagnoses and all orders for this visit:    Lumbar spine pain    Lumbar stenosis    Lumbar radicular pain    Degenerative lumbar disc      Assessment:Very pleasant 62-year-old female otherwise healthy with:      1.  9-10 years of lumbar spine pain worsening with time has become quite severe recently.  She has broad-based disc bulge at L4-5 with an extruded disc herniation and degenerative changes resulting in severe central stenosis and compression of the nerve roots within the lateral recess.  Her symptoms have somewhat stabilized with physical therapy however she has had some right leg paresthesias in an L5 distribution.      2.  Right greater than left lower extremity radicular symptoms likely related to the central stenosis at L4-5.  3.  Degenerative disc disease and facet arthropathy.  Facet arthropathy most significant L4-5 and L3-4.      Discussion:    1.  Patient is doing fairly well after lumbar epidural.  She still having some right lower extremity radicular symptoms but overall much improved and able to tolerate her symptoms currently.  She is taking turmeric which is helpful.  Encouraged her to continue taking turmeric over-the-counter as it is helping with her pain.  2.  We discussed lumbar stenosis as well as red flags.  We discussed if she does have any urinary hesitancy/retention or incontinence along with bowel issues or saddle anesthesia to present to the emergency department and educated her in cauda equina.  3.  If her symptoms worsen she should return to clinic.      It was our pleasure caring for your patient today, if there any questions or concerns please do not hesitate to contact us.      Subjective:   Patient ID: Jazmine Potter is a 62 y.o. female.    History of Present Illness:   Patient presents for evaluation of low back pain and right greater than left lower extremity pain and paresthesias.  Symptoms have improved since last visit.  Had lumbar epidural  bilaterally.  No adverse effects.  Doing quite well.  The back pain has nearly resolved.  She still having some right leg paresthesias to the lateral ankle occasionally worse with walking or standing or sitting for more than 15-30 minutes at a time.  Pain is a 2/10 today 6/10 at worst.  She does some bar dips but allows her body to hang a traction type maneuver and standing on her legs again is somewhat painful in the left but overall her pain is much improved.    She has questions today regarding long-term management.  She is planning and potential surgical intervention but would like to get through a few more months before halfway.  She is taking turmeric which is also helpful for her back pain and leg symptoms.  Tried meloxicam with no benefit.  She is quite pleased with the injection however.      Imaging: MRI lumbar spine images personally reviewed.  Lumbar degenerative disc disease most significant finding central stenosis L4-5 with large broad-based disc bulge.    Review of Systems: She has some numbness and tingling in the right leg and occasional weakness of the right knee.  No bowel or bladder incontinence, headache, dizziness, nausea, vomiting, blurred vision or balance changes.    Past Medical History:   Diagnosis Date     Hypercholesterolemia     Created by Conversion      Rosacea     Created by Conversion        The following portions of the patient's history were reviewed and updated as appropriate: allergies, current medications, past family history, past medical history, past social history, past surgical history and problem list.      Objective:   Physical Exam:    Vitals:    02/09/18 1402   BP: 135/66   Pulse: 68       General: Alert and oriented with normal affect. Attention, knowledge, memory, and language are intact. No acute distress.   Eyes: Sclerae are clear.  Respirations: Unlabored. CV: No lower extremity edema.   Gait:  Nonantalgic  Sensation is intact to light touch throughout the    lower extremities.  Reflexes are   2+ patellar and Achilles  .    Manual muscle testing reveals:  Right /Left out of 5     5/5 knee flexors  5/5 knee extensors  5/5 ankle plantar flexors-toe raises  5/5 ankle dorsiflexors  5/5  EHL

## 2021-10-12 ENCOUNTER — ANCILLARY PROCEDURE (OUTPATIENT)
Dept: BONE DENSITY | Facility: CLINIC | Age: 66
End: 2021-10-12
Attending: OBSTETRICS & GYNECOLOGY
Payer: COMMERCIAL

## 2021-10-12 DIAGNOSIS — Z13.820 SCREENING FOR OSTEOPOROSIS: ICD-10-CM

## 2021-10-12 PROCEDURE — 77080 DXA BONE DENSITY AXIAL: CPT | Mod: TC | Performed by: RADIOLOGY

## 2023-09-19 ENCOUNTER — LAB REQUISITION (OUTPATIENT)
Dept: LAB | Facility: CLINIC | Age: 68
End: 2023-09-19
Payer: COMMERCIAL

## 2023-09-19 DIAGNOSIS — Z83.49 FAMILY HISTORY OF OTHER ENDOCRINE, NUTRITIONAL AND METABOLIC DISEASES: ICD-10-CM

## 2023-09-19 DIAGNOSIS — Z13.220 ENCOUNTER FOR SCREENING FOR LIPOID DISORDERS: ICD-10-CM

## 2023-09-19 DIAGNOSIS — Z13.1 ENCOUNTER FOR SCREENING FOR DIABETES MELLITUS: ICD-10-CM

## 2023-09-19 DIAGNOSIS — Z12.4 ENCOUNTER FOR SCREENING FOR MALIGNANT NEOPLASM OF CERVIX: ICD-10-CM

## 2023-09-19 DIAGNOSIS — Z13.0 ENCOUNTER FOR SCREENING FOR DISEASES OF THE BLOOD AND BLOOD-FORMING ORGANS AND CERTAIN DISORDERS INVOLVING THE IMMUNE MECHANISM: ICD-10-CM

## 2023-09-19 LAB
ALBUMIN SERPL BCG-MCNC: 4.6 G/DL (ref 3.5–5.2)
ALP SERPL-CCNC: 75 U/L (ref 35–104)
ALT SERPL W P-5'-P-CCNC: 19 U/L (ref 0–50)
ANION GAP SERPL CALCULATED.3IONS-SCNC: 13 MMOL/L (ref 7–15)
AST SERPL W P-5'-P-CCNC: 25 U/L (ref 0–45)
BILIRUB SERPL-MCNC: 0.4 MG/DL
BUN SERPL-MCNC: 16.5 MG/DL (ref 8–23)
CALCIUM SERPL-MCNC: 9.6 MG/DL (ref 8.8–10.2)
CHLORIDE SERPL-SCNC: 111 MMOL/L (ref 98–107)
CHOLEST SERPL-MCNC: 237 MG/DL
CREAT SERPL-MCNC: 0.83 MG/DL (ref 0.51–0.95)
DEPRECATED HCO3 PLAS-SCNC: 25 MMOL/L (ref 22–29)
EGFRCR SERPLBLD CKD-EPI 2021: 78 ML/MIN/1.73M2
ERYTHROCYTE [DISTWIDTH] IN BLOOD BY AUTOMATED COUNT: 11.8 % (ref 10–15)
GLUCOSE SERPL-MCNC: 87 MG/DL (ref 70–99)
HCT VFR BLD AUTO: 44.5 % (ref 35–47)
HDLC SERPL-MCNC: 55 MG/DL
HGB BLD-MCNC: 14.5 G/DL (ref 11.7–15.7)
HOLD SPECIMEN: NORMAL
LDLC SERPL CALC-MCNC: 158 MG/DL
MCH RBC QN AUTO: 31.4 PG (ref 26.5–33)
MCHC RBC AUTO-ENTMCNC: 32.6 G/DL (ref 31.5–36.5)
MCV RBC AUTO: 96 FL (ref 78–100)
NONHDLC SERPL-MCNC: 182 MG/DL
PLATELET # BLD AUTO: 408 10E3/UL (ref 150–450)
POTASSIUM SERPL-SCNC: 5 MMOL/L (ref 3.4–5.3)
PROT SERPL-MCNC: 7.2 G/DL (ref 6.4–8.3)
RBC # BLD AUTO: 4.62 10E6/UL (ref 3.8–5.2)
SODIUM SERPL-SCNC: 149 MMOL/L (ref 136–145)
TRIGL SERPL-MCNC: 120 MG/DL
TSH SERPL DL<=0.005 MIU/L-ACNC: 1.75 UIU/ML (ref 0.3–4.2)
WBC # BLD AUTO: 7 10E3/UL (ref 4–11)

## 2023-09-19 PROCEDURE — G0145 SCR C/V CYTO,THINLAYER,RESCR: HCPCS | Mod: ORL | Performed by: OBSTETRICS & GYNECOLOGY

## 2023-09-19 PROCEDURE — 80061 LIPID PANEL: CPT | Mod: ORL | Performed by: OBSTETRICS & GYNECOLOGY

## 2023-09-19 PROCEDURE — 80053 COMPREHEN METABOLIC PANEL: CPT | Mod: ORL | Performed by: OBSTETRICS & GYNECOLOGY

## 2023-09-19 PROCEDURE — 84443 ASSAY THYROID STIM HORMONE: CPT | Mod: ORL | Performed by: OBSTETRICS & GYNECOLOGY

## 2023-09-19 PROCEDURE — 85027 COMPLETE CBC AUTOMATED: CPT | Mod: ORL | Performed by: OBSTETRICS & GYNECOLOGY

## 2023-09-22 LAB
BKR LAB AP GYN ADEQUACY: NORMAL
BKR LAB AP GYN INTERPRETATION: NORMAL
BKR LAB AP HPV REFLEX: NORMAL
BKR LAB AP LMP: NORMAL
BKR LAB AP PREVIOUS ABNL DX: NORMAL
BKR LAB AP PREVIOUS ABNORMAL: NORMAL
PATH REPORT.COMMENTS IMP SPEC: NORMAL
PATH REPORT.COMMENTS IMP SPEC: NORMAL
PATH REPORT.RELEVANT HX SPEC: NORMAL

## 2023-10-18 ENCOUNTER — ANCILLARY PROCEDURE (OUTPATIENT)
Dept: BONE DENSITY | Facility: CLINIC | Age: 68
End: 2023-10-18
Attending: OBSTETRICS & GYNECOLOGY
Payer: COMMERCIAL

## 2023-10-18 DIAGNOSIS — Z13.820 ENCOUNTER FOR SCREENING FOR OSTEOPOROSIS: ICD-10-CM

## 2023-10-18 PROCEDURE — 77080 DXA BONE DENSITY AXIAL: CPT | Mod: TC | Performed by: PHYSICIAN ASSISTANT

## 2024-03-27 ENCOUNTER — TRANSFERRED RECORDS (OUTPATIENT)
Dept: HEALTH INFORMATION MANAGEMENT | Facility: CLINIC | Age: 69
End: 2024-03-27
Payer: COMMERCIAL

## 2024-07-18 ENCOUNTER — TRANSFERRED RECORDS (OUTPATIENT)
Dept: HEALTH INFORMATION MANAGEMENT | Facility: CLINIC | Age: 69
End: 2024-07-18
Payer: COMMERCIAL